# Patient Record
Sex: MALE | Race: BLACK OR AFRICAN AMERICAN | NOT HISPANIC OR LATINO | Employment: FULL TIME | URBAN - METROPOLITAN AREA
[De-identification: names, ages, dates, MRNs, and addresses within clinical notes are randomized per-mention and may not be internally consistent; named-entity substitution may affect disease eponyms.]

---

## 2017-09-15 ENCOUNTER — OFFICE VISIT (OUTPATIENT)
Dept: INTERNAL MEDICINE | Facility: CLINIC | Age: 42
End: 2017-09-15
Payer: COMMERCIAL

## 2017-09-15 VITALS
TEMPERATURE: 99 F | WEIGHT: 160.5 LBS | OXYGEN SATURATION: 99 % | SYSTOLIC BLOOD PRESSURE: 136 MMHG | BODY MASS INDEX: 27.4 KG/M2 | HEIGHT: 64 IN | DIASTOLIC BLOOD PRESSURE: 94 MMHG | HEART RATE: 102 BPM

## 2017-09-15 DIAGNOSIS — M25.562 CHRONIC PAIN OF LEFT KNEE: Primary | ICD-10-CM

## 2017-09-15 DIAGNOSIS — G89.29 CHRONIC PAIN OF LEFT KNEE: Primary | ICD-10-CM

## 2017-09-15 PROCEDURE — 3008F BODY MASS INDEX DOCD: CPT | Mod: S$GLB,,, | Performed by: PHYSICIAN ASSISTANT

## 2017-09-15 PROCEDURE — 99999 PR PBB SHADOW E&M-NEW PATIENT-LVL III: CPT | Mod: PBBFAC,,, | Performed by: PHYSICIAN ASSISTANT

## 2017-09-15 PROCEDURE — 99202 OFFICE O/P NEW SF 15 MIN: CPT | Mod: S$GLB,,, | Performed by: PHYSICIAN ASSISTANT

## 2017-09-15 RX ORDER — METFORMIN HYDROCHLORIDE 500 MG/1
500 TABLET ORAL DAILY
COMMUNITY

## 2017-09-15 RX ORDER — LISINOPRIL 10 MG/1
10 TABLET ORAL DAILY
COMMUNITY

## 2017-09-15 RX ORDER — MELOXICAM 15 MG/1
15 TABLET ORAL DAILY
Qty: 30 TABLET | Refills: 0 | Status: SHIPPED | OUTPATIENT
Start: 2017-09-15 | End: 2018-08-11 | Stop reason: CLARIF

## 2017-09-15 NOTE — PROGRESS NOTES
Subjective:       Patient ID: Viral Alves  is a 41 y.o. male.    Chief Complaint: left knee pain x's 4-5 months    Knee Pain    Incident onset: 3 months. Injury mechanism: Feels like getting in and out of his work truck seems to aggrivate it.  Pain location: medial and lateral side of the right knee. The pain is moderate. The pain has been fluctuating since onset. Associated symptoms include an inability to bear weight and a loss of motion. Pertinent negatives include no loss of sensation, muscle weakness, numbness or tingling. The symptoms are aggravated by movement, palpation and weight bearing. He has tried ice and immobilization for the symptoms. The treatment provided mild relief.     Review of Systems   Constitutional: Negative for chills and fatigue.   Respiratory: Negative for chest tightness and shortness of breath.    Cardiovascular: Negative for chest pain.   Gastrointestinal: Negative for abdominal pain.   Neurological: Negative for tingling and numbness.       Objective:      Physical Exam   Constitutional: He appears well-developed and well-nourished. No distress.   HENT:   Head: Normocephalic and atraumatic.   Cardiovascular: Normal rate and regular rhythm.    Pulmonary/Chest: Effort normal and breath sounds normal.   Musculoskeletal:        Left knee: He exhibits decreased range of motion, swelling and abnormal meniscus. Tenderness found. Medial joint line and lateral joint line tenderness noted.   Skin: He is not diaphoretic.   Nursing note and vitals reviewed.      Assessment:       1. Chronic pain of left knee        Plan:       Chronic pain of left knee  -     MRI Lower Extremity Joint W WO Contrast Left; Future; Expected date: 09/15/2017    Other orders  -     meloxicam (MOBIC) 15 MG tablet; Take 1 tablet (15 mg total) by mouth once daily.  Dispense: 30 tablet; Refill: 0

## 2017-09-15 NOTE — PATIENT INSTRUCTIONS
Continue with your medicine until gone. Rest and use ice as needed. Follow up if not better in 2 weeks. Suggest to go to the ER if you become much worse. Suggest to establish care with a PCP.

## 2017-09-21 ENCOUNTER — TELEPHONE (OUTPATIENT)
Dept: INTERNAL MEDICINE | Facility: CLINIC | Age: 42
End: 2017-09-21

## 2017-09-21 ENCOUNTER — HOSPITAL ENCOUNTER (OUTPATIENT)
Dept: RADIOLOGY | Facility: HOSPITAL | Age: 42
Discharge: HOME | End: 2017-09-21
Attending: PHYSICIAN ASSISTANT
Payer: COMMERCIAL

## 2017-09-21 DIAGNOSIS — M25.562 CHRONIC PAIN OF LEFT KNEE: ICD-10-CM

## 2017-09-21 DIAGNOSIS — S83.529A: Primary | ICD-10-CM

## 2017-09-21 DIAGNOSIS — G89.29 CHRONIC PAIN OF LEFT KNEE: ICD-10-CM

## 2017-09-21 PROCEDURE — 73721 MRI JNT OF LWR EXTRE W/O DYE: CPT | Mod: TC,LT

## 2017-09-21 NOTE — TELEPHONE ENCOUNTER
Patient has a soft tissue tear of the knee. I suggest to follow up with orthopedics. A referral has been done.

## 2017-09-27 NOTE — TELEPHONE ENCOUNTER
Spoke with Ms. White per patient request. Results and recommendations given with verbalized understanding. Appt with ortho on 10/9/17 at 1 pm at [facility]. Appt slip mailed to home address.

## 2017-10-01 ENCOUNTER — NURSE TRIAGE (OUTPATIENT)
Dept: ADMINISTRATIVE | Facility: CLINIC | Age: 42
End: 2017-10-01

## 2017-10-01 NOTE — TELEPHONE ENCOUNTER
EC/Spouse advised to have patient evaluated in ED as symptoms/evaluation/Rx and treatment plan was established in per MD has not been ineffective and patent is in severe pain;understood/agreed to such.

## 2017-10-01 NOTE — TELEPHONE ENCOUNTER
"  Answer Assessment - Initial Assessment Questions  1. SYMPTOMS: "Do you have any symptoms?"      EC/Spouse states patient is experiencing severe pain and is crying; and prescribed Mobic is"not helping".   2. SEVERITY: If symptoms are present, ask "Are they mild, moderate or severe?"      Severe    Protocols used: ST MEDICATION QUESTION CALL-A-AH    "

## 2017-10-06 DIAGNOSIS — M25.562 LEFT KNEE PAIN, UNSPECIFIED CHRONICITY: Primary | ICD-10-CM

## 2017-10-09 ENCOUNTER — HOSPITAL ENCOUNTER (OUTPATIENT)
Dept: RADIOLOGY | Facility: HOSPITAL | Age: 42
Discharge: HOME | End: 2017-10-09
Attending: PHYSICIAN ASSISTANT
Payer: COMMERCIAL

## 2017-10-09 ENCOUNTER — OFFICE VISIT (OUTPATIENT)
Dept: ORTHOPEDICS | Facility: CLINIC | Age: 42
End: 2017-10-09
Payer: COMMERCIAL

## 2017-10-09 VITALS
SYSTOLIC BLOOD PRESSURE: 174 MMHG | HEART RATE: 98 BPM | DIASTOLIC BLOOD PRESSURE: 113 MMHG | WEIGHT: 163.56 LBS | BODY MASS INDEX: 28.08 KG/M2

## 2017-10-09 DIAGNOSIS — M25.562 CHRONIC PAIN OF LEFT KNEE: Primary | ICD-10-CM

## 2017-10-09 DIAGNOSIS — I10 HYPERTENSION, UNSPECIFIED TYPE: ICD-10-CM

## 2017-10-09 DIAGNOSIS — T14.8XXA CONTUSION OF BONE: Primary | ICD-10-CM

## 2017-10-09 DIAGNOSIS — M25.562 LEFT KNEE PAIN, UNSPECIFIED CHRONICITY: ICD-10-CM

## 2017-10-09 DIAGNOSIS — M62.81 QUADRICEPS WEAKNESS: ICD-10-CM

## 2017-10-09 DIAGNOSIS — G89.29 CHRONIC PAIN OF LEFT KNEE: Primary | ICD-10-CM

## 2017-10-09 DIAGNOSIS — T14.8XXA CONTUSION OF BONE: ICD-10-CM

## 2017-10-09 DIAGNOSIS — M25.562 LEFT KNEE PAIN, UNSPECIFIED CHRONICITY: Primary | ICD-10-CM

## 2017-10-09 PROCEDURE — 73562 X-RAY EXAM OF KNEE 3: CPT | Mod: 26,59,RT, | Performed by: RADIOLOGY

## 2017-10-09 PROCEDURE — 99204 OFFICE O/P NEW MOD 45 MIN: CPT | Mod: S$GLB,,, | Performed by: ORTHOPAEDIC SURGERY

## 2017-10-09 PROCEDURE — 73564 X-RAY EXAM KNEE 4 OR MORE: CPT | Mod: 26,LT,, | Performed by: RADIOLOGY

## 2017-10-09 PROCEDURE — 99999 PR PBB SHADOW E&M-EST. PATIENT-LVL II: CPT | Mod: PBBFAC,,, | Performed by: ORTHOPAEDIC SURGERY

## 2017-10-09 PROCEDURE — 73564 X-RAY EXAM KNEE 4 OR MORE: CPT | Mod: TC,PO,LT

## 2017-10-09 NOTE — PROGRESS NOTES
Subjective:     Patient ID: Viral Alves  is a 41 y.o. male.    Chief Complaint: Pain of the Left Knee    Viral Alves  is a 41 y.o. male here for Left knee pain began approx 6mo ago. Denies injury to the knee at any point in his life. Has a hx of high BP, and elevated readings in clinic today. Denies chest pain/shortness of breath/headache/blurry vision. Has tried ice and elevation of the knee with minimal relief.      Knee Pain    The pain is present in the left knee. This is a new problem. The current episode started more than 1 month ago. There has been no history of extremity trauma. The problem occurs intermittently. The problem has been unchanged. The quality of the pain is described as sharp and throbbing. The pain is at a severity of 5/10. Pertinent negatives include no fever or numbness. He has tried cold and OTC pain meds for the symptoms. The treatment provided no relief.       Past Medical History:   Diagnosis Date    Diabetes mellitus, type 2     Hypertension      History reviewed. No pertinent surgical history.  Family History   Problem Relation Age of Onset    Diabetes Mother     Hypertension Mother     Diabetes Father     Hypertension Father      Social History     Social History    Marital status: Single     Spouse name: N/A    Number of children: N/A    Years of education: N/A     Occupational History    Not on file.     Social History Main Topics    Smoking status: Never Smoker    Smokeless tobacco: Never Used    Alcohol use Yes    Drug use: Unknown    Sexual activity: Not on file     Other Topics Concern    Not on file     Social History Narrative    No narrative on file     Medication List with Changes/Refills   Current Medications    LISINOPRIL 10 MG TABLET    Take 10 mg by mouth once daily.    MAGNESIUM SALICYLATE/CAFFEINE (DIUREX ORAL)    Take by mouth.    MELOXICAM (MOBIC) 15 MG TABLET    Take 1 tablet (15 mg total) by mouth once daily.    METFORMIN (GLUCOPHAGE) 500 MG  TABLET    Take 500 mg by mouth once daily.     Review of patient's allergies indicates:  No Known Allergies  Review of Systems   Constitution: Negative for fever and night sweats.   HENT: Negative for hearing loss.    Eyes: Negative for blurred vision and visual disturbance.   Cardiovascular: Negative for chest pain and leg swelling.   Respiratory: Negative for shortness of breath.    Endocrine: Negative for polyuria.   Hematologic/Lymphatic: Negative for bleeding problem.   Skin: Negative for rash.   Musculoskeletal: Positive for joint pain and joint swelling. Negative for back pain, muscle cramps and muscle weakness.   Gastrointestinal: Negative for melena.   Genitourinary: Negative for hematuria.   Neurological: Negative for loss of balance, numbness and paresthesias.   Psychiatric/Behavioral: Negative for altered mental status.       Objective:   Body mass index is 28.08 kg/m².  Vitals:    10/09/17 1301   BP: (!) 174/113   Pulse: 98       General: Viral  is well-developed, well-nourished, appears stated age, in no acute distress, alert and oriented to time, place and person.       General    Vitals reviewed.  Constitutional: He is oriented to person, place, and time. He appears well-developed and well-nourished. No distress.   HENT:   Mouth/Throat: No oropharyngeal exudate.   Eyes: Right eye exhibits no discharge. Left eye exhibits no discharge.   Neck: Normal range of motion.   Cardiovascular: Intact distal pulses.    Pulmonary/Chest: Effort normal and breath sounds normal. No respiratory distress.   Neurological: He is alert and oriented to person, place, and time. He has normal reflexes. No cranial nerve deficit. Coordination normal.   Psychiatric: He has a normal mood and affect. His behavior is normal. Judgment and thought content normal.     General Musculoskeletal Exam   Gait: normal       Right Knee Exam     Inspection   Erythema: absent  Scars: absent  Swelling: absent  Effusion: effusion  Deformity:  deformity  Bruising: absent    Tenderness   The patient is experiencing no tenderness.         Range of Motion   Extension: 0   Flexion: 140     Tests   Meniscus   Katie:  Medial - negative Lateral - negative  Ligament Examination Lachman: normal (-1 to 2mm) PCL-Posterior Drawer: normal (0 to 2mm)     MCL - Valgus: normal (0 to 2mm)  LCL - Varus: normalPivot Shift: normal (Equal)Reverse Pivot Shift: normal (Equal)Dial Test at 30 degrees: normal (< 5 degrees)Dial Test at 90 degrees: normal (< 5 degrees)  Posterior Sag Test: negative  Posterolateral Corner: unstable (>15 degrees difference)  Patella   Patellar Apprehension: negative  Passive Patellar Tilt: neutral  Patellar Tracking: normal  Patellar Glide (quadrants): Lateral - 1   Medial - 2  Q-Angle at 90 degrees: normal  Patellar Grind: negative  J-Sign: none    Other   Meniscal Cyst: absent  Popliteal (Baker's) Cyst: absent  Sensation: normal    Left Knee Exam     Inspection   Erythema: absent  Scars: absent  Swelling: present  Effusion: absent  Deformity: deformity  Bruising: absent    Range of Motion   Extension: 0   Flexion: 120     Tests   Stability Lachman: normal (-1 to 2mm) PCL-Posterior Drawer: normal (0 to 2mm)  MCL - Valgus: normal (0 to 2mm)  LCL - Varus: normal (0 to 2mm)Pivot Shift: normal (Equal)Reverse Pivot Shift: normal (Equal)  Posterior Sag Test: negative  Patella   Patellar Apprehension: negative  Passive Patellar Tilt: neutral  Patellar Tracking: normal  Patellar Glide (Quadrants): Lateral - 1 Medial - 2  Q-Angle at 90 degrees: normal  Patellar Grind: negative  J-Sign: J sign absent    Other   Meniscal Cyst: absent  Popliteal (Baker's) Cyst: absent  Sensation: normal    Comments:  Exam extremely difficult secondary to cooperation. Patient with superficial and deep tenderness to various portions of the knee, thigh and leg, but on secondary examination no longer tender in those same areas.     Pain on palpation of nonanatomic landmarks and  superficial muscle palpation that were no longer painful with distraction.    No apprehension with patellar stress. No tenderness over MPFL or MCL on last exam, however, this fluctuated 3 times during the examination over the same area of palpation.      Right Hip Exam     Tests   Yrn: negative  Left Hip Exam     Tests   Yrn: negative          Reflexes     Right Side   Quadriceps:  2+  Achilles:  2+    Vascular Exam     Right Pulses  Dorsalis Pedis:      2+  Posterior Tibial:      2+        Left Pulses  Dorsalis Pedis:      2+  Posterior Tibial:      2+          An AP standing view of both knees, a patellar axial view of both knees, and a lateral view of the bilateral knee was obtained and reviewed personally by me.     The patella appear well positioned within the intercondylar notches bilaterally. The medial and lateral joint compartments appear relatively well preserved.    MRI reviewed by me: increased signal in the MPFL/MCL/PCL. Bone edema in medial femoral condyle.     Assessment:     Encounter Diagnoses   Name Primary?    Chronic pain of left knee Yes    Hypertension, unspecified type     Contusion of bone         Plan:     1. Exam limited secondary to cooperation. Patient with inconsistent areas of pain on knee exam and refusal to cooperate. After explaining need for cooperation for a proper assessment.  No apparent instability for PCL. No medial condyle tenderness on last palpation.     On further questioning, his spouse Bianka accompanying him states he feels his knee pain as a sharp pain in his head. I expressed concern to them given his high blood pressure that the intermittent pain in his head could be related to the hypertension, and recommended they go to urgent care/ER for evaluation for the high blood pressure.     No clinical findings of PCL instability of patellar instability based on examination. Areas of Medial femoral condyle tenderness that subsided on secondary examination.    Spouse  stated that he has been in pain and the meloxicam isnt strong enough and they wanted stronger pain medicine. I explained that we would not prescribe any narcotic pain medication for any patients other than in the postoperative period, but that I would be glad to refer them to a pain management specialist if they felt that the current regimen was not controlling the pain. Patient and spouse declined interest in having a pain management referral at this time but stated they would think about it.    2. Given amount of bony edema, would recommend Medial  brace for medial femoral condyle contusion    3. PT for quad strengthening    4. ED for HTN

## 2017-10-10 ENCOUNTER — TELEPHONE (OUTPATIENT)
Dept: INTERNAL MEDICINE | Facility: CLINIC | Age: 42
End: 2017-10-10

## 2017-10-10 NOTE — TELEPHONE ENCOUNTER
----- Message from Christina Mcneal sent at 10/9/2017  1:57 PM CDT -----  Contact: Brett/wife  Please call wife @ [phone] regarding test results, wife states she is getting two different results.

## 2017-10-12 NOTE — TELEPHONE ENCOUNTER
Ms. White says that ortho apparently does not agree with the MRI results. The patient is hurting a lot and she would like to know what is next and can you give him something else for pain the Mobic is not helping.    I do not have a license to give narcotics for something that may me becoming chronic in nature. I suggest for him to go to PT as suggested by ortho and follow up with his new PCP to discuss his options.

## 2017-10-16 NOTE — TELEPHONE ENCOUNTER
Discussed recommendations with patients wife and they are not real happy with the service outside of our appt. They may decide to go somewhere else. I offered to help with the transition if possible. She will call if needed.

## 2017-10-26 ENCOUNTER — DOCUMENTATION ONLY (OUTPATIENT)
Dept: ORTHOPEDICS | Facility: CLINIC | Age: 42
End: 2017-10-26

## 2017-10-26 NOTE — PROGRESS NOTES
Spoke with Douglas Romo who states he has been unsuccessful contacting pt to measure for brace. Vocalized understanding.

## 2018-08-11 ENCOUNTER — HOSPITAL ENCOUNTER (EMERGENCY)
Facility: HOSPITAL | Age: 43
Discharge: HOME | End: 2018-08-11
Attending: EMERGENCY MEDICINE
Payer: COMMERCIAL

## 2018-08-11 VITALS
SYSTOLIC BLOOD PRESSURE: 168 MMHG | HEIGHT: 62 IN | RESPIRATION RATE: 18 BRPM | TEMPERATURE: 98 F | OXYGEN SATURATION: 96 % | BODY MASS INDEX: 29.81 KG/M2 | HEART RATE: 100 BPM | DIASTOLIC BLOOD PRESSURE: 95 MMHG | WEIGHT: 162 LBS

## 2018-08-11 DIAGNOSIS — S39.012A BACK STRAIN, INITIAL ENCOUNTER: ICD-10-CM

## 2018-08-11 DIAGNOSIS — M54.50 LOW BACK PAIN: ICD-10-CM

## 2018-08-11 DIAGNOSIS — M79.601 RIGHT ARM PAIN: ICD-10-CM

## 2018-08-11 DIAGNOSIS — I10 ELEVATED BLOOD PRESSURE READING WITH DIAGNOSIS OF HYPERTENSION: ICD-10-CM
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
## 2018-08-11 DIAGNOSIS — R51.9 FRONTAL HEADACHE: ICD-10-CM

## 2018-08-11 DIAGNOSIS — F17.200 CURRENT SMOKER ON SOME DAYS: ICD-10-CM

## 2018-08-11 DIAGNOSIS — M54.9 MID BACK PAIN: ICD-10-CM

## 2018-08-11 PROCEDURE — 99283 EMERGENCY DEPT VISIT LOW MDM: CPT | Mod: 25

## 2018-08-11 RX ORDER — DICLOFENAC SODIUM 75 MG/1
75 TABLET, DELAYED RELEASE ORAL 2 TIMES DAILY
Qty: 10 TABLET | Refills: 0 | Status: SHIPPED | OUTPATIENT
Start: 2018-08-11

## 2018-08-11 RX ORDER — CYCLOBENZAPRINE HCL 10 MG
10 TABLET ORAL NIGHTLY PRN
Qty: 10 TABLET | Refills: 0 | Status: SHIPPED | OUTPATIENT
Start: 2018-08-11 | End: 2018-08-21
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
## 2019-08-13 ENCOUNTER — PATIENT OUTREACH (OUTPATIENT)
Dept: ADMINISTRATIVE | Facility: HOSPITAL | Age: 44
End: 2019-08-13